# Patient Record
Sex: FEMALE | Race: BLACK OR AFRICAN AMERICAN | NOT HISPANIC OR LATINO | Employment: FULL TIME | URBAN - METROPOLITAN AREA
[De-identification: names, ages, dates, MRNs, and addresses within clinical notes are randomized per-mention and may not be internally consistent; named-entity substitution may affect disease eponyms.]

---

## 2023-09-27 ENCOUNTER — HOSPITAL ENCOUNTER (OUTPATIENT)
Dept: DATA CONVERSION | Facility: HOSPITAL | Age: 48
Discharge: HOME | End: 2023-09-27
Payer: COMMERCIAL

## 2023-09-29 DIAGNOSIS — M79.642 LEFT HAND PAIN: ICD-10-CM

## 2023-09-29 DIAGNOSIS — M25.642 STIFFNESS OF LEFT HAND JOINT: Primary | ICD-10-CM

## 2023-10-04 ENCOUNTER — TREATMENT (OUTPATIENT)
Dept: OCCUPATIONAL THERAPY | Facility: CLINIC | Age: 48
End: 2023-10-04
Payer: COMMERCIAL

## 2023-10-04 DIAGNOSIS — M79.642 LEFT HAND PAIN: ICD-10-CM

## 2023-10-04 DIAGNOSIS — M25.642 STIFFNESS OF LEFT HAND JOINT: ICD-10-CM

## 2023-10-04 PROCEDURE — 97110 THERAPEUTIC EXERCISES: CPT | Mod: GO | Performed by: OCCUPATIONAL THERAPIST

## 2023-10-04 ASSESSMENT — PAIN - FUNCTIONAL ASSESSMENT: PAIN_FUNCTIONAL_ASSESSMENT: 0-10

## 2023-10-04 ASSESSMENT — PAIN SCALES - GENERAL: PAINLEVEL_OUTOF10: 0 - NO PAIN

## 2023-10-04 NOTE — PROGRESS NOTES
Occupational Therapy    Occupational Therapy Treatment    Name: Shana Millan  MRN: [id number]  : 1975  Date: 10/04/23  Time Calculation  Start Time: 1030  Stop Time: 1116  Time Calculation (min): 46 min    Assessment:  OT Assessment Results: Decreased upper extremity range of motion (Patient tolerated PROM well despite reports of increased pain; PROM available greater than AROM at this time; weakness noted with digital extension.)  Plan:       Subjective   General:  OT Last Visit  OT Received On: 10/04/23    Patient into clinic with custom digital extension splint donned; no complaints at this time; issued patient replacement finger compression sleeves to be worn with splint.      Pain Assessment:  Pain Assessment  Pain Assessment: 0-10  Pain Score: 0 - No pain (at rest; increased to 10/10 at times during PROM to SF)    Objective      Modalities:  Modalities Used: Yes  Modality 1: Untimed Fluidotherapy (Fluidotherapy in conjunction with AROM ther ex L hand all planes to optimize joint mobility and comfort x10 minutes.)    Therapy/Activity: Therapeutic Exercise  Therapeutic Exercise Performed: Yes  Therapeutic Exercise Activity 1: PROM for L SF PIP extension with 15 seciond hold at end range. (10 reps)  Therapeutic Exercise Activity 2: Reverse blocking L SF (10 reps)  Therapeutic Exercise Activity 3: Isometric lumbrical gripping with L hand (Emphasis on PIP depression; 10 reps)  Therapeutic Exercise Activity 4: L SF extension with soft theraputty (10 reps)    Therapist provided demonstration with verbal instruction for all above there ex; handouts issued as needed for reinforcement/carryover with HEP.    Therapist added padding to DIP portion of finger extension splint to increase stretch and encourage greater extension of PIP joint with wear.    Therapist utilized aluminum splinting material and instructed patient in therapeutic wrapping with [place] at PIP joint to encourage progressive extension;  supplies issued; to be added to HEP; recommending wear 3x/day x15 minutes per session.     OP EDUCATION: Educated patient on importance of follow through with HEP and increased frequency throughout day (patient reports HEP 2x/day currently; recommend 3-5x/day); patient receptive to education.       Goals:

## 2023-10-10 PROBLEM — G40.409 GRAND MAL SEIZURE DISORDER (MULTI): Status: ACTIVE | Noted: 2023-10-10

## 2023-10-10 PROBLEM — S63.259A FINGER DISLOCATION: Status: ACTIVE | Noted: 2023-03-23

## 2023-10-10 PROBLEM — G40.909 SEIZURE DISORDER (MULTI): Status: ACTIVE | Noted: 2023-10-10

## 2023-10-10 PROBLEM — S93.402A SPRAIN OF LEFT ANKLE: Status: ACTIVE | Noted: 2017-01-10

## 2023-10-10 PROBLEM — M24.542 CONTRACTURE OF JOINT OF FINGER OF LEFT HAND: Status: ACTIVE | Noted: 2023-10-10

## 2023-10-10 PROBLEM — M25.472 LEFT ANKLE SWELLING: Status: ACTIVE | Noted: 2017-01-10

## 2023-10-10 RX ORDER — ERYTHROMYCIN 5 MG/G
OINTMENT OPHTHALMIC
COMMUNITY
Start: 2021-09-23

## 2023-10-10 RX ORDER — ZONISAMIDE 100 MG/1
4 CAPSULE ORAL DAILY
COMMUNITY
End: 2023-10-11 | Stop reason: SDUPTHER

## 2023-10-10 RX ORDER — LEVETIRACETAM 750 MG/1
2 TABLET ORAL 2 TIMES DAILY
COMMUNITY
End: 2023-10-11 | Stop reason: SDUPTHER

## 2023-10-10 RX ORDER — OLOPATADINE HYDROCHLORIDE 1 MG/ML
SOLUTION/ DROPS OPHTHALMIC
COMMUNITY
Start: 2020-02-28

## 2023-10-10 RX ORDER — DICLOFENAC SODIUM 75 MG/1
TABLET, DELAYED RELEASE ORAL
COMMUNITY
Start: 2022-03-16

## 2023-10-10 RX ORDER — CARBOXYMETHYLCELLULOSE SODIUM 2.5 MG/ML
SOLUTION/ DROPS OPHTHALMIC
COMMUNITY

## 2023-10-10 RX ORDER — HYALURONATE SODIUM 20 MG/2 ML
SYRINGE (ML) INTRAARTICULAR
COMMUNITY
Start: 2022-05-10

## 2023-10-10 NOTE — PROGRESS NOTES
Subjective     Shana Millan is a  48 y.o. female who presents with No chief complaint on file.  . .    Visit type: follow up visit    HPI   Last seen by Dr. Russell in 1/2022. At that time, her seizures were well controlled on Keppra 750 mg, 2 tablets twice a day and zonisamide 100 mg, 4 capsules daily and current medication were continued.     Since then, she has been doing well. Had one event likely provoked by illness as below, otherwise seizure free and stable on current meds for about 6 years.     Semiology: GTC  Aura: none   Last event: She was taken to the hospital about 2 weeks ago. She had been sick. She had a seizure in the car and sister brought her to hospital (sister was driving) and low potassium and viral illness was treated with supportive care and she was given fluids and sent home. No meds were changed as it was thought to be provoked. Sickness has been a common trigger for her.   Medication review: Keppra 1500 mg BID, zonisamide 400 mg daily. Reports total compliance.  New illness/hospitalization: as above  Mood: up and down because of work, every now and then and feels it comes out of no where.   Memory: good  Work: yes  Sleep: Only gets 2 to 3 hours. Trouble staying asleep. Can go to sleep OK but wakes up in the middle of the night and cannot go back to sleep. Had sleep   Driving: no  Contraception: no   Bloodwork: has not had levels recently     Patient denies any auras, shaking, jerking, convulsions, loss of time or zoning out, tongue or cheek biting, bowel or bladder incontinence, or unexplained bruising.      Review of Systems  10 point review of systems performed and is negative except as noted in the HPI.     All other systems have been reviewed and are negative for complaint.    Past Medical History:   Diagnosis Date    Epilepsy, unspecified, not intractable, without status epilepticus (CMS/HCC)     Epilepsy    Migraine, unspecified, not intractable, without status migrainosus      Migraines    Personal history of other specified conditions     History of insomnia       No relevant family history has been documented for this patient.    Past Surgical History:   Procedure Laterality Date    OTHER SURGICAL HISTORY  04/22/2020    Meniscus repair    OTHER SURGICAL HISTORY  04/22/2020    Ear surgery    OTHER SURGICAL HISTORY  04/22/2020    Hand surgery       Social History     Substance and Sexual Activity   Drug Use Not on file     Tobacco Use: Not on file     Alcohol Use: Not on file           Objective     Neurological Exam  Mental Status  Awake, alert and oriented to person, place and time. Oriented to person, place, time and situation. Recent and remote memory are intact. Speech is normal. Language is fluent with no aphasia. Attention and concentration are normal.    Cranial Nerves  CN III, IV, VI: Extraocular movements intact bilaterally. Normal lids and orbits bilaterally.  CN VII: Full and symmetric facial movement.  CN VIII: Hearing is normal.    Motor   No abnormal involuntary movements.  Strength not assessed due to virtual nature of visit..           No results found for this or any previous visit.           Assessment/Plan   A video visit between the patient (at the originating site) and the provider (at the distant site) was utilized to provide this telehealth service.     Verbal consent was requested and obtained from the patient on this date for a telehealth visit. The patient was informed about the telehealth clinical encounter including benefits to avoiding travel, limitations to the assessment, and billing for the service. In person care may be recommended if needed. Telehealth sessions are not being recorded and personal health information is protected. All questions were answered and verbal informal consent was obtained from the patient to proceed.    Problem List Items Addressed This Visit       Seizure disorder (CMS/HCC) - Primary    Overview     Previously seen by   Drew. Last in Jan 2022. At that time, Seizures were well controlled on Keppra 750 mg, 2 tablets twice a day and zonisamide 100 mg, 4 capsules daily         Current Assessment & Plan     -Continue current medications , refills sent   - CBC and BUN/Cr from Aug 2023 WNL.  - Has not had levels in a few years, will obtain for baseline.   - Educated patient that he should not drive, operate heavy machinery, work around sharp objects or open flames, work on high surfaces, or swim or bath alone, or any other activity where he would be a risk to himself or others if a seizure were to occur for at least 6 months and until cleared by a neurologist.  - Follow up in 1 year or sooner as needed         Relevant Medications    levETIRAcetam (Keppra) 750 mg tablet    zonisamide (Zonegran) 100 mg capsule    Other Relevant Orders    Referral to Adult Sleep Medicine    Zonisamide level    Levetiracetam    Vitamin D 25-Hydroxy,Total (for eval of Vitamin D levels)

## 2023-10-10 NOTE — ASSESSMENT & PLAN NOTE
-Continue current medications , refills sent   - CBC and BUN/Cr from Aug 2023 WNL.  - Has not had levels in a few years, will obtain for baseline.   - Educated patient that he should not drive, operate heavy machinery, work around sharp objects or open flames, work on high surfaces, or swim or bath alone, or any other activity where he would be a risk to himself or others if a seizure were to occur for at least 6 months and until cleared by a neurologist.  - Follow up in 1 year or sooner as needed

## 2023-10-10 NOTE — PATIENT INSTRUCTIONS
Continue current medications, refills sent   Please go get a level drawn of your seizure medications at any  lab. Please go first thing in the morning before your first dose if this is possible. You can take your pill with you and take your medication as soon as they draw your blood in order to help us get an accurate level. If you are unable to go first thing in the morning, please go as late as possible before taking your evening dose. If your levels are abnormal, we will call you to adjust your medications if needed.    Follow up in 1 year or sooner as needed.     You have had a seizure. It is recommended that you take precaution to avoid injury with possible future seizure episodes. Avoid activities that can lead to injury such as bathing, swimming alone, driving or operating heavy machinery, or working at heights (no ladders) for 6 months. These precautions restart with any future seizure. Avoid possible triggers for seizure activity including alcohol or drug use, stress, not eating, and sleep deprivation.    Guidelines on when to treat a first seizure found that starting seizure medications after the first seizure can lessen the risk for more seizures in the first 2 years. The report also found:  When an adult has an unprovoked seizure, the risk for more seizures without treatment in the next 2 years can range 21 to 45%. This risk is greatest in people who have had a brain injury (such as stroke or trauma) and seizure activity is seen on the EEG. Other factors that may lead to a higher risk for seizures in the first 2 years are other abnormalities on brain imaging tests and seizures at night.  Starting a seizure medication right away may help lessen this early risk, but does not seem to affect the long-term risk of seizures. The risk of side effects from seizure medications may range from 7 to 31%. Medication side effects are usually mild and go away. Early treatment may not affect the person's quality of  life over time.    Please be sure your family and friends know how to manage any future seizures you may have. This includes staying calm, turning you on your side and removing potential close hazards, cushion your head if able, remove any glasses, do NOT put anything in your mouth, observe and monitor duration. It is normal to be tired and disoriented in your post-ictal (after seizure) phase. This can last several minutes up to hours. You may develop a headache during this time.     Call us to notify us if you have any seizure activity.   When to call 911: if you are injured during the seizure (including hitting your head), if the seizure lasts longer than 5-10 minutes, if your face/lips turn blue, if you do not start to wake up after about 30 mins following the seizure, or if it is not your typical seizure activity.     Additional helpful resources are available at:  [employer] - www.epilepsy.com  [facility] - www.[facility].org

## 2023-10-11 ENCOUNTER — TELEMEDICINE (OUTPATIENT)
Dept: NEUROLOGY | Facility: CLINIC | Age: 48
End: 2023-10-11
Payer: COMMERCIAL

## 2023-10-11 DIAGNOSIS — G40.909 SEIZURE DISORDER (MULTI): Primary | ICD-10-CM

## 2023-10-11 PROCEDURE — 99214 OFFICE O/P EST MOD 30 MIN: CPT

## 2023-10-11 RX ORDER — LEVETIRACETAM 750 MG/1
1500 TABLET ORAL 2 TIMES DAILY
Qty: 360 TABLET | Refills: 3 | Status: SHIPPED | OUTPATIENT
Start: 2023-10-11 | End: 2024-01-09

## 2023-10-11 RX ORDER — ZONISAMIDE 100 MG/1
400 CAPSULE ORAL DAILY
Qty: 360 CAPSULE | Refills: 3 | Status: SHIPPED | OUTPATIENT
Start: 2023-10-11 | End: 2024-01-09

## 2023-10-18 ENCOUNTER — TREATMENT (OUTPATIENT)
Dept: OCCUPATIONAL THERAPY | Facility: CLINIC | Age: 48
End: 2023-10-18
Payer: COMMERCIAL

## 2023-10-18 DIAGNOSIS — M79.642 LEFT HAND PAIN: ICD-10-CM

## 2023-10-18 DIAGNOSIS — M25.642 STIFFNESS OF LEFT HAND JOINT: ICD-10-CM

## 2023-10-18 PROCEDURE — 97110 THERAPEUTIC EXERCISES: CPT | Mod: GO,CO

## 2023-10-18 ASSESSMENT — PAIN SCALES - GENERAL: PAINLEVEL_OUTOF10: 0 - NO PAIN

## 2023-10-18 ASSESSMENT — PAIN - FUNCTIONAL ASSESSMENT: PAIN_FUNCTIONAL_ASSESSMENT: 0-10

## 2023-10-18 NOTE — PROGRESS NOTES
"Occupational Therapy    Occupational Therapy Treatment    Name: Shana Millan  MRN: [id number]  : 1975  Date: 10/18/23  Time Calculation  Start Time: 0930  Stop Time: 1012  Time Calculation (min): 42 min    Assessment:   No edema, continues with contracture of L SF PIP in flexion, some stiffness noted in DIP with flexion. Pt wearing custom L SF ext splint all of the time.  Wrapping finger while in aluminum foam ext splint twice daily during exercise x 10 min. Pt to progress to 2-3 times a day up to 15 min each wear.        Subjective  Reviewed OT goals. Pt verbalized understanding.  General:  OT Last Visit  OT Received On: 10/18/23     Pain Assessment:  Pain Assessment  Pain Assessment: 0-10  Pain Score: 0 - No pain    Objective    Modalities:  Modalities Used: Yes  Modality 1: Untimed Fluidotherapy (Pt completed tendon glides in fluido x 10 min to promote muscle movement during session.)  Splinting:  Splinting  Location: L SF  Type: extension splint  Splinting: Adjustment  Splinting Education: Precautions, Wear schedule  Splinting Comments: adjusted splint to promote more L SF ext, replaced strapping and provided pt with replacement finger compression sleeve     Therapy/Activity:  Manual PROM by writer with focus on PIP ext and DIP flexion.  Pt completed tendon glides while in fluido. Pt completed 15 reps of Reverse blocking of PIP in ext and marker rolls to encourage ext.     Sensory: Intact   Outcome Measures:    Measured AROM on this date of  L SF at:  MP 0/90  PIP -40/85  DIP 0/48    Progress towards goals :     Pt will increase CHAO of L SF to 200 for improved functional use of L Hand- GOAL PARTIALLY MET-  CHAO 183  2.   Pt will increase L SF ext to -10 to promote increased functional use of L hand- GOAL PARTIALLY MET- Pt currently at -40  3.   Pt will independently open a jar with self-reported \"No difficulty\" per UEFI-  GOAL PARTIALLY MET- pt now verbalizing \"a little bit\".  4.   Pt will " "independently groom her hair with self reported \"no difficulty\" per UEFI-GOAL MET- Pt verbalizing \"No difficulty\" at this time.  5.   Pt will increase UEFI score to at least 65/80 to indicate overall improved functional independence and ease- GOAL PARTIALLY MET- Pt score 58/80.    Pt pain at end of session 0/10. No fatigue in L hand. Pt progressing towards all goals. Continues with flexion contracture of L SF PIP and stiffness in L SF DIP. Pt highly motivated to return function to normal. Will progress therapy based on feedback from next doctor appointment scheduled for 10/30/23.                   "

## 2023-10-30 ENCOUNTER — APPOINTMENT (OUTPATIENT)
Dept: ORTHOPEDIC SURGERY | Facility: CLINIC | Age: 48
End: 2023-10-30

## 2023-11-06 ENCOUNTER — APPOINTMENT (OUTPATIENT)
Dept: ORTHOPEDIC SURGERY | Facility: CLINIC | Age: 48
End: 2023-11-06

## 2023-11-20 ENCOUNTER — OFFICE VISIT (OUTPATIENT)
Dept: ORTHOPEDIC SURGERY | Facility: CLINIC | Age: 48
End: 2023-11-20
Payer: COMMERCIAL

## 2023-11-20 DIAGNOSIS — M24.542 CONTRACTURE OF FINGER JOINT, LEFT: Primary | ICD-10-CM

## 2023-11-20 PROCEDURE — 1036F TOBACCO NON-USER: CPT | Performed by: ORTHOPAEDIC SURGERY

## 2023-11-20 PROCEDURE — 99213 OFFICE O/P EST LOW 20 MIN: CPT | Performed by: ORTHOPAEDIC SURGERY

## 2023-11-20 ASSESSMENT — ENCOUNTER SYMPTOMS
CHILLS: 0
JOINT SWELLING: 1
WHEEZING: 0
COLOR CHANGE: 0
SHORTNESS OF BREATH: 0
EYE DISCHARGE: 0
TROUBLE SWALLOWING: 0
FEVER: 0

## 2023-11-20 ASSESSMENT — PAIN - FUNCTIONAL ASSESSMENT: PAIN_FUNCTIONAL_ASSESSMENT: 0-10

## 2023-11-20 ASSESSMENT — PAIN SCALES - GENERAL: PAINLEVEL_OUTOF10: 7

## 2023-11-20 NOTE — PROGRESS NOTES
Reason for Appointment  Improved L small finger contracture    History of Present Illness  Patient is a 48 y.o. female here today for follow-up evaluation of \A left small finger flexion contracture after a dislocation 7 months ago.  She has been in formal therapy and has seen a significant improvement.  She still has some mild stiffness but this has improved since previous visit.  She is not in formal therapy now, she does have an extension splint she wears on the finger.  No other changes in her past medical history, allergies, or medications.     Past Medical History:   Diagnosis Date    Epilepsy, unspecified, not intractable, without status epilepticus (CMS/[facility])     Epilepsy    Migraine, unspecified, not intractable, without status migrainosus     Migraines    Personal history of other specified conditions     History of insomnia       Past Surgical History:   Procedure Laterality Date    OTHER SURGICAL HISTORY  04/22/2020    Meniscus repair    OTHER SURGICAL HISTORY  04/22/2020    Ear surgery    OTHER SURGICAL HISTORY  04/22/2020    Hand surgery       Medication Documentation Review Audit       Reviewed by Maria Antonia Maldonado PA-C (Physician Assistant) on 11/20/23 at 0911      Medication Order Taking? Sig Documenting Provider Last Dose Status   carboxymethylcellulose (TheraTears) 0.25 % ophthalmic solution [id number]  Administer into affected eye(s). Historical Provider, MD  Active   diclofenac (Voltaren) 75 mg EC tablet [id number]  Take by mouth. Historical Provider, MD  Active   erythromycin (Romycin) 5 mg/gram (0.5 %) ophthalmic ointment [id number]  Use as directed Historical Provider, MD  Active   levETIRAcetam (Keppra) 750 mg tablet [id number]  Take 2 tablets (1,500 mg) by mouth 2 times a day. Berenice Tobar PA-C  Active   olopatadine (Patanol) 0.1 % ophthalmic solution [id number]  Administer into affected eye(s). Historical Provider, MD  Active   sodium hyaluronate (Euflexxa) 10 mg/mL(mw 2.4 -3.6 million)  injection [id number]  Inject 2 mL weekly x 3 to the right knee Historical Provider, MD  Active   zonisamide (Zonegran) 100 mg capsule [id number]  Take 4 capsules (400 mg) by mouth once daily. At noon Berenice Tobar PA-C  Active                    No Known Allergies    Review of Systems   Constitutional:  Negative for chills and fever.   HENT:  Negative for trouble swallowing.    Eyes:  Negative for discharge.   Respiratory:  Negative for shortness of breath and wheezing.    Cardiovascular:  Negative for chest pain.   Musculoskeletal:  Positive for joint swelling.   Skin:  Negative for color change and pallor.   All other systems reviewed and are negative.    Exam   On exam she still has some swelling over the left small PIP joint but significantly improved contracture, only about a 15 degree flexion contracture at the PIP joint today.  She has some mild stiffness with full flexion of the digit.  Good pulses and sensation in the upper extremity.    Assessment   Encounter Diagnosis   Name Primary?    Contracture of finger joint, left Yes       Plan   She has seen significant improvement in terms of the contracture in the finger.  We would like to avoid surgery at this point, she needs to continue to work in formal therapy on swelling control and regaining motion as well as dynamic splinting.  She can follow-up with us in 2 months    Written by Maria Antonia Maldonado PA-C

## 2023-11-29 ENCOUNTER — APPOINTMENT (OUTPATIENT)
Dept: OCCUPATIONAL THERAPY | Facility: CLINIC | Age: 48
End: 2023-11-29
Payer: COMMERCIAL

## 2023-11-29 NOTE — PROGRESS NOTES
Occupational Therapy                 Therapy Communication Note    Patient Name: Shana Millan  MRN: [id number]  Today's Date: 11/29/2023     Discipline: Occupational Therapy    Missed Time: Pt called to Cancel at 12:30 p.m due to unable to make it to appointment today.

## 2023-12-07 ENCOUNTER — DOCUMENTATION (OUTPATIENT)
Dept: OCCUPATIONAL THERAPY | Facility: CLINIC | Age: 48
End: 2023-12-07

## 2023-12-07 ENCOUNTER — APPOINTMENT (OUTPATIENT)
Dept: OCCUPATIONAL THERAPY | Facility: CLINIC | Age: 48
End: 2023-12-07
Payer: COMMERCIAL

## 2023-12-07 NOTE — PROGRESS NOTES
Occupational Therapy                 Therapy Communication Note    Patient Name: Shana Millan  MRN: [id number]  Today's Date: 12/7/2023     Discipline: Occupational Therapy    Missed Visit Reason:  Patient canceled appointment through centralized scheduling this date.    Missed Time: Cancel    Comment:

## 2023-12-14 ENCOUNTER — TREATMENT (OUTPATIENT)
Dept: OCCUPATIONAL THERAPY | Facility: CLINIC | Age: 48
End: 2023-12-14
Payer: COMMERCIAL

## 2023-12-14 DIAGNOSIS — M79.642 LEFT HAND PAIN: Primary | ICD-10-CM

## 2023-12-14 PROCEDURE — 97110 THERAPEUTIC EXERCISES: CPT | Mod: GO,CO

## 2023-12-14 ASSESSMENT — PAIN - FUNCTIONAL ASSESSMENT: PAIN_FUNCTIONAL_ASSESSMENT: 0-10

## 2023-12-14 ASSESSMENT — PAIN SCALES - GENERAL: PAINLEVEL_OUTOF10: 0 - NO PAIN

## 2023-12-14 NOTE — PROGRESS NOTES
Occupational Therapy    Occupational Therapy Treatment    Name: Shana Millan  MRN: [id number]  : 1975  Date: 23  Time Calculation  Start Time: 0947  Stop Time: 7  Time Calculation (min): 40 min      Visit 2 of 5 approved, time frame 10/18/23-23  Insurance: [employer]   Member ID [id number]  Will need re-authorization prior to next visit due to out of time frame.     Referred by Dr. Maria Antonia Maldonado. Next follow up on 24    Assessment:   Pt arrived with new orders from Maria Antonia Maldonado PA-C to continue to work on dynamic and static splinting and regaining full digital motion. Swelling control with compression.     No edema, continues with contracture of L SF PIP in flexion and extension, some stiffness noted in DIP with flexion.     Splinting:   Pt is wearing custom SF ext splint all day and night along with compression sleeve under. Writer replaced all strapping, velcro and padding on this date. Splint continues to place SF in full ext. Pt also using exercise splint at home to encourage PIP ext 2 times daily at 5 reps with 5 sec hold. Encouraged to increase reps to 10.        Subjective  Reviewed OT goals. Pt verbalized understanding. Pt stated she works 10 hour days, 6 days a week and that her job does not require heavy lifting. 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 Pt frustrated with fine motor tasks.      Pain Assessment:  Pain Assessment  Pain Assessment: 0-10  Pain Score: 0 - No pain    Objective    Modalities:  AROM/tendon glides while in fluido therapy x 10 min to promote muscle movement during session      Therapy/Activity:    Manual PROM by writer with focus on PIP ext and DIP flexion. Good tolerance by pt.   Pt completed 15 reps of Reverse blocking of PIP in ext  with 5 sec hold  Completed marker rolls to encourage ext.  , 2 sets of 10 reps with 5 sec hold.   Pt using orange(low/medium ) resistance theraputty at home  HEP reviewed for isometric foam exer for lumbrica, gross grasp and  "composite pinch  Added opposition exercises with rubber band around marker due to pt reporting difficulty on UEFI pulling her hair up into a pony tail. Added to HEP    Sensory: Intact  No numbness or tingling noted.      Outcome Measures:      UEFI completed today at 77/80(was 58/80) Noted difficulty with fine motor tasks, tying shoes, grooming hair and opening a jar.     Measured AROM on this date of  L SF at:  MP 0(same)/80(was 90)  PIP -25(was -40)/78(was 75)  DIP 0(same)/45(was 48)    Progress towards goals :     Pt will increase CHAO of L SF to 200 for improved functional use of L Hand- GOAL PARTIALLY MET-  CHAO 178 today(was 183 last visit)  2.   Pt will increase L SF ext to -10 to promote increased functional use of L hand- GOAL PARTIALLY MET- Pt currently at -25  3.   Pt will independently open a jar with self-reported \"No difficulty\" per UEFI-  GOAL PARTIALLY MET- pt now verbalizing \"a little bit\".  4.   Pt will independently groom her hair with self reported \"no difficulty\" per UEFI-GOAL Partially MET- Pt verbalizing \"little bit difficulty\" at this time.  5.   Pt will increase UEFI score to at least 65/80 to indicate overall improved functional independence and ease- GOAL MET.    Pt pain at end of session 0/10. No fatigue in L hand. Pt Continues with flexion contracture of L SF PIP and stiffness in L SF DIP. Pt highly motivated to return function to normal. Will ask for date extension in order for pt to meet established goals.   Education  Individual(s) Educated: Patient  Education Provided: Edema control, Orthotics wearing schedule and precautions, POC discussed and agreed upon  Home Program: PROM, AROM, Edema control, Fine motor tasks, Modalities, Orthotic wearing schedule, care and precautions, Strengthening  Equipment: Thera-putty, Sleeve, Biofreeze, Foam sponge gripper  Patient/Caregiver Demonstrated Understanding: yes  Plan of Care Discussed and Agreed Upon: yes  Patient Response to Education: " Patient/Caregiver Verbalized Understanding of Information, Patient/Caregiver Performed Return Demonstration of Exercises/Activities, Patient/Caregiver Asked Appropriate Questions

## 2024-01-29 ENCOUNTER — APPOINTMENT (OUTPATIENT)
Dept: ORTHOPEDIC SURGERY | Facility: CLINIC | Age: 49
End: 2024-01-29

## 2024-02-23 ENCOUNTER — DOCUMENTATION (OUTPATIENT)
Dept: OCCUPATIONAL THERAPY | Facility: CLINIC | Age: 49
End: 2024-02-23

## 2024-02-23 NOTE — PROGRESS NOTES
"Occupational Therapy Discharge [facility]    Patient Name: Shana Millan  MRN: [id number]  Today's Date: 2/23/2024    Subjective   Current Problem:  Contracture of L SF.    Pain: N/a       Objective   Interventions: Ther ex, ther act, splinting, manual, neuromuscular re-education     ADL Assessment: UEFI= 58/80 as of 10/158/24     Extremity Assessments:      AROM L SF:  MP 0/90  PIP -40/85  DIP 0/48    Assessment/Plan Patient inconsistently seen throughout POC due to multiple cancellations and no shows for scheduled appointments. Due to lapsed authorization period, patient obtained several re-authorizations and patient failed to attend approved appointments.          Goals:   Pt will increase CHAO of L SF to 200 for improved functional use of L Hand- GOAL PARTIALLY MET-    EUO=118 degrees  2.   Pt will increase L SF ext to -10 to promote increased functional use of L hand- GOAL PARTIALLY MET-  PIP extension= -40  3.   Pt will independently open a jar with self-reported \"No difficulty\" per UEFI-  GOAL PARTIALLY ME  \"A little bit\" of difficulty reported  4.   Pt will independently groom her hair with self reported \"no difficulty\" per UEFI-GOAL MET-   \"No difficulty\" reported  5.   Pt will increase UEFI score to at least 65/80 to indicate overall improved functional independence/ease-  58/80.  "

## 2024-03-18 ENCOUNTER — OFFICE VISIT (OUTPATIENT)
Dept: ORTHOPEDIC SURGERY | Facility: CLINIC | Age: 49
End: 2024-03-18
Payer: COMMERCIAL

## 2024-03-18 ENCOUNTER — HOSPITAL ENCOUNTER (OUTPATIENT)
Dept: RADIOLOGY | Facility: CLINIC | Age: 49
Discharge: HOME | End: 2024-03-18
Payer: COMMERCIAL

## 2024-03-18 DIAGNOSIS — M24.542 CONTRACTURE OF FINGER JOINT, LEFT: Primary | ICD-10-CM

## 2024-03-18 DIAGNOSIS — M79.645 FINGER PAIN, LEFT: ICD-10-CM

## 2024-03-18 PROCEDURE — 99213 OFFICE O/P EST LOW 20 MIN: CPT | Performed by: ORTHOPAEDIC SURGERY

## 2024-03-18 PROCEDURE — 1036F TOBACCO NON-USER: CPT | Performed by: ORTHOPAEDIC SURGERY

## 2024-03-18 PROCEDURE — 20604 DRAIN/INJ JOINT/BURSA W/US: CPT | Performed by: ORTHOPAEDIC SURGERY

## 2024-03-18 PROCEDURE — 73140 X-RAY EXAM OF FINGER(S): CPT | Mod: LT

## 2024-03-18 RX ORDER — METHYLPREDNISOLONE ACETATE 40 MG/ML
10 INJECTION, SUSPENSION INTRA-ARTICULAR; INTRALESIONAL; INTRAMUSCULAR; SOFT TISSUE
Status: COMPLETED | OUTPATIENT
Start: 2024-03-18 | End: 2024-03-18

## 2024-03-18 RX ORDER — LIDOCAINE HYDROCHLORIDE 10 MG/ML
0.5 INJECTION INFILTRATION; PERINEURAL
Status: COMPLETED | OUTPATIENT
Start: 2024-03-18 | End: 2024-03-18

## 2024-03-18 RX ADMIN — LIDOCAINE HYDROCHLORIDE 0.5 ML: 10 INJECTION INFILTRATION; PERINEURAL at 12:39

## 2024-03-18 RX ADMIN — METHYLPREDNISOLONE ACETATE 10 MG: 40 INJECTION, SUSPENSION INTRA-ARTICULAR; INTRALESIONAL; INTRAMUSCULAR; SOFT TISSUE at 12:39

## 2024-03-18 ASSESSMENT — ENCOUNTER SYMPTOMS
WHEEZING: 0
COLOR CHANGE: 0
CHILLS: 0
TROUBLE SWALLOWING: 0
FEVER: 0
JOINT SWELLING: 1
SHORTNESS OF BREATH: 0
EYE DISCHARGE: 0

## 2024-03-18 ASSESSMENT — PAIN - FUNCTIONAL ASSESSMENT: PAIN_FUNCTIONAL_ASSESSMENT: 0-10

## 2024-03-18 ASSESSMENT — PAIN SCALES - GENERAL: PAINLEVEL_OUTOF10: 5 - MODERATE PAIN

## 2024-03-18 NOTE — PROGRESS NOTES
Reason for Appointment  L small finger pain    History of Present Illness  Patient is a 49 y.o. female here today for follow-up evaluation of continued left small finger pain.  She is 10 months status post a left small finger PIP joint dislocation.  She developed a flexion contracture at the PIP joint, she worked in formal therapy and has a splint and she has seen some improvement in terms of the contracture.  She continues to have mostly pain over the PIP joint and swelling.  She did have another incident where she jammed the finger a week ago, x-rays taken today are reviewed and do not show any acute fracture but she has had some increased pain since that time. No other changes in her past medical history, allergies, or medications.     Past Medical History:   Diagnosis Date    Epilepsy, unspecified, not intractable, without status epilepticus (CMS/[facility])     Epilepsy    Migraine, unspecified, not intractable, without status migrainosus     Migraines    Personal history of other specified conditions     History of insomnia       Past Surgical History:   Procedure Laterality Date    OTHER SURGICAL HISTORY  04/22/2020    Meniscus repair    OTHER SURGICAL HISTORY  04/22/2020    Ear surgery    OTHER SURGICAL HISTORY  04/22/2020    Hand surgery       Medication Documentation Review Audit       Reviewed by Marcela Rodriguez MA (Medical Assistant) on 03/18/24 at 1115      Medication Order Taking? Sig Documenting Provider Last Dose Status   carboxymethylcellulose (TheraTears) 0.25 % ophthalmic solution [id number] Yes Administer into affected eye(s). Historical Provider, MD Taking Active   diclofenac (Voltaren) 75 mg EC tablet [id number] Yes Take by mouth. Historical Provider, MD Taking Active   erythromycin (Romycin) 5 mg/gram (0.5 %) ophthalmic ointment [id number] Yes Use as directed Historical Provider, MD Taking Active   levETIRAcetam (Keppra) 750 mg tablet [id number]  Take 2 tablets (1,500 mg) by mouth 2 times a day.  Berenice Tobar PA-C   24 2359   olopatadine (Patanol) 0.1 % ophthalmic solution [id number] Yes Administer into affected eye(s). Historical Provider, MD Taking Active   sodium hyaluronate (Euflexxa) 10 mg/mL(mw 2.4 -3.6 million) injection [id number] Yes Inject 2 mL weekly x 3 to the right knee Historical Provider, MD Taking Active   zonisamide (Zonegran) 100 mg capsule [id number]  Take 4 capsules (400 mg) by mouth once daily. At noon Berenice Tobar PA-C   24 2356                    No Known Allergies    Review of Systems   Constitutional:  Negative for chills and fever.   HENT:  Negative for mouth sores and trouble swallowing.    Eyes:  Negative for discharge.   Respiratory:  Negative for shortness of breath and wheezing.    Cardiovascular:  Negative for chest pain.   Musculoskeletal:  Positive for joint swelling.   Skin:  Negative for color change and pallor.   All other systems reviewed and are negative.    Exam   On exam the left small finger shows again about a 15 degree flexion contracture at the PIP joint, she still has some swelling over the PIP joint and tenderness to palpation.  She has some mild stiffness with flexion but retained motion overall.  Good pulses and sensation in the upper extremity.    Assessment   Encounter Diagnosis   Name Primary?    Finger pain, left Yes   Left small finger PIP joint contracture    Plan   She has continued to work on splinting and in therapy and has not seen much improvement.  Her biggest issue is pain and swelling in the joint.  We discussed a simple steroid injection today to see how much relief this gives her.  We sterilely injected under ultrasound guidance Depo-Medrol lidocaine in the left small finger PIP joint.  Patient understands the small risk of infection and the signs look for as well as flare reaction.  Hopefully this gives her good relief.  She can follow-up with us in 8 weeks.    S Inj/Asp: L small PIP on 3/18/2024 12:39  PM  Indications: pain  Details: 25 G needle, ultrasound-guided  Medications: 0.5 mL lidocaine 10 mg/mL (1 %); 10 mg methylPREDNISolone acetate 40 mg/mL  Outcome: tolerated well, no immediate complications    After discussing the risks and benefits of the procedure, we proceeded with the injection.  Under ultrasound guidance the left small finger metacarpal head and the base of the proximal phalanx was identified.  We then placed the needle under ultrasound guidance into the joint space and sterilely injected 20 mg of Depo-Medrol and a small amount of lidocaine.  Patient tolerated the procedure well without any adverse side effects.   Procedure, treatment alternatives, risks and benefits explained, specific risks discussed. Immediately prior to procedure a time out was called to verify the correct patient, procedure, equipment, support staff and site/side marked as required. Patient was prepped and draped in the usual sterile fashion.       Written by Maria Antonia Guillory saw evaluated, and treated the patient with the PA

## 2024-05-13 ENCOUNTER — APPOINTMENT (OUTPATIENT)
Dept: ORTHOPEDIC SURGERY | Facility: CLINIC | Age: 49
End: 2024-05-13
Payer: COMMERCIAL

## 2024-06-03 ENCOUNTER — OFFICE VISIT (OUTPATIENT)
Dept: ORTHOPEDIC SURGERY | Facility: CLINIC | Age: 49
End: 2024-06-03
Payer: COMMERCIAL

## 2024-06-03 DIAGNOSIS — M24.542 CONTRACTURE OF FINGER JOINT, LEFT: Primary | ICD-10-CM

## 2024-06-03 PROCEDURE — 99213 OFFICE O/P EST LOW 20 MIN: CPT | Performed by: ORTHOPAEDIC SURGERY

## 2024-06-03 PROCEDURE — 1036F TOBACCO NON-USER: CPT | Performed by: ORTHOPAEDIC SURGERY

## 2024-06-03 ASSESSMENT — ENCOUNTER SYMPTOMS
WOUND: 0
SHORTNESS OF BREATH: 0
CHILLS: 0
EYE DISCHARGE: 0
ARTHRALGIAS: 1
TROUBLE SWALLOWING: 0
JOINT SWELLING: 1
FEVER: 0
WHEEZING: 0

## 2024-06-03 ASSESSMENT — PAIN - FUNCTIONAL ASSESSMENT: PAIN_FUNCTIONAL_ASSESSMENT: 0-10

## 2024-06-03 ASSESSMENT — PAIN SCALES - GENERAL: PAINLEVEL_OUTOF10: 0 - NO PAIN

## 2024-06-03 NOTE — PROGRESS NOTES
Reason for Appointment  Chief Complaint   Patient presents with    Left Little Finger - Follow-up     Pain/cramping     History of Present Illness  Patient is a 49 y.o. female here today for follow-up evaluation of continued left small finger pain. She is over a year status post left small finger PIP joint dislocation.  She continues to have a flexion contracture.  The finger will swell on her at times and she states she gets some cramping in the finger with a lot of repetitive activity.  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  She had a PIP joint injection back in March that also did not help her.  No other changes in her past medical history, allergies, or medications.     Past Medical History:   Diagnosis Date    Epilepsy, unspecified, not intractable, without status epilepticus ([facility])     Epilepsy    Migraine, unspecified, not intractable, without status migrainosus     Migraines    Personal history of other specified conditions     History of insomnia       Past Surgical History:   Procedure Laterality Date    OTHER SURGICAL HISTORY  2020    Meniscus repair    OTHER SURGICAL HISTORY  2020    Ear surgery    OTHER SURGICAL HISTORY  2020    Hand surgery       Medication Documentation Review Audit       Reviewed by Maria Antonia Maldonado PA-C (Physician Assistant) on 24 at 0812      Medication Order Taking? Sig Documenting Provider Last Dose Status   carboxymethylcellulose (TheraTears) 0.25 % ophthalmic solution [id number] Yes Administer into affected eye(s). Historical Provider, MD Taking Active   diclofenac (Voltaren) 75 mg EC tablet [id number] Yes Take by mouth. Historical Provider, MD Taking Active   erythromycin (Romycin) 5 mg/gram (0.5 %) ophthalmic ointment [id number] Yes Use as directed Historical Provider, MD Taking Active   levETIRAcetam (Keppra) 750 mg tablet [id number]  Take 2 tablets (1,500 mg) by mouth 2 times a day. Berenice Tobar PA-C   24 3152   olopatadine (Patanol)  0.1 % ophthalmic solution [id number] Yes Administer into affected eye(s). Historical Provider, MD Taking Active   sodium hyaluronate (Euflexxa) 10 mg/mL(mw 2.4 -3.6 million) injection [id number] Yes Inject 2 mL weekly x 3 to the right knee Historical Provider, MD Taking Active   zonisamide (Zonegran) 100 mg capsule [id number]  Take 4 capsules (400 mg) by mouth once daily. At noon Berenice Tobar PA-C   24 2359                    No Known Allergies    Review of Systems   Constitutional:  Negative for chills and fever.   HENT:  Negative for ear pain, postnasal drip and trouble swallowing.    Eyes:  Negative for discharge.   Respiratory:  Negative for shortness of breath and wheezing.    Cardiovascular:  Negative for chest pain.   Musculoskeletal:  Positive for arthralgias and joint swelling.   Skin:  Negative for rash and wound.   All other systems reviewed and are negative.    Exam   On exam the left small finger shows about a 30 degree flexion contracture at the PIP joint, she does have some mild skin thinning from previous injection.  No active triggering or A1 pulley tenderness today.  She does have good flexion of the digit.  Mild tenderness over the PIP joint.  Good pulses and sensation in the upper extremity.    Assessment   Left small finger flexion contracture    Plan   We had a long discussion with her today, we do long discussion today with her about her options.  Any operative treatment in terms of capsular release may make her worse and she may develop significant stiffness with flexion and she has good functional motion today.  A previous injection did not give her much improvement in pain or stiffness.  We will try a Dynasplint with intense home exercises see if we can see any improvement in the contracture.  She can follow-up with us in 3 months.    Written by Maria Antonia Guillory saw, evaluated, and treated the patient with the PA

## 2024-06-11 ENCOUNTER — EVALUATION (OUTPATIENT)
Dept: OCCUPATIONAL THERAPY | Facility: HOSPITAL | Age: 49
End: 2024-06-11
Payer: COMMERCIAL

## 2024-06-11 DIAGNOSIS — M79.645 FINGER PAIN, LEFT: Primary | ICD-10-CM

## 2024-06-11 DIAGNOSIS — M24.542 CONTRACTURE OF FINGER JOINT, LEFT: ICD-10-CM

## 2024-06-11 PROCEDURE — 97165 OT EVAL LOW COMPLEX 30 MIN: CPT | Mod: GO | Performed by: OCCUPATIONAL THERAPIST

## 2024-06-11 ASSESSMENT — LIFESTYLE VARIABLES
SKIP TO QUESTIONS 9-10: 1
HOW OFTEN DO YOU HAVE A DRINK CONTAINING ALCOHOL: NEVER
HOW MANY STANDARD DRINKS CONTAINING ALCOHOL DO YOU HAVE ON A TYPICAL DAY: PATIENT DOES NOT DRINK
HOW OFTEN DO YOU HAVE SIX OR MORE DRINKS ON ONE OCCASION: NEVER
AUDIT-C TOTAL SCORE: 0

## 2024-06-11 ASSESSMENT — COLUMBIA-SUICIDE SEVERITY RATING SCALE - C-SSRS
1. IN THE PAST MONTH, HAVE YOU WISHED YOU WERE DEAD OR WISHED YOU COULD GO TO SLEEP AND NOT WAKE UP?: NO
2. HAVE YOU ACTUALLY HAD ANY THOUGHTS OF KILLING YOURSELF?: NO
6. HAVE YOU EVER DONE ANYTHING, STARTED TO DO ANYTHING, OR PREPARED TO DO ANYTHING TO END YOUR LIFE?: NO

## 2024-06-11 ASSESSMENT — ENCOUNTER SYMPTOMS
DEPRESSION: 0
LOSS OF SENSATION IN FEET: 0
OCCASIONAL FEELINGS OF UNSTEADINESS: 0

## 2024-06-11 ASSESSMENT — PAIN SCALES - GENERAL: PAINLEVEL_OUTOF10: 0 - NO PAIN

## 2024-06-11 ASSESSMENT — ACTIVITIES OF DAILY LIVING (ADL)
BATHING_ASSISTANCE: INDEPENDENT
ADL_ASSISTANCE: INDEPENDENT

## 2024-06-11 ASSESSMENT — PAIN - FUNCTIONAL ASSESSMENT: PAIN_FUNCTIONAL_ASSESSMENT: 0-10

## 2024-06-11 NOTE — PROGRESS NOTES
Occupational Therapy  Occupational Therapy Evaluation    Patient Name: Shana Millan  MRN: [id number]  : 1975  Today's Date: 2024  Time Calculation  Start Time: 1007  Stop Time: 1045  Time Calculation (min): 38 min  Today's Charges:  OT Evaluation Time Entry  OT Evaluation (Low) Time Entry: 38    Current Problem  Problem List Items Addressed This Visit             ICD-10-CM    Contracture of finger joint, left M24.542    Relevant Orders    Follow Up In Occupational Therapy    Finger pain, left - Primary M79.645    Relevant Orders    Follow Up In Occupational Therapy     Insurance  Payor: [facility] / Plan: [facility] / Product Type: *No Product type* /     Assessment  OT Assessment  OT Assessment Results: Decreased ADL status, Decreased upper extremity range of motion, Decreased upper extremity strength, Decreased fine motor control  Strengths: Ability to acquire knowledge, Attitude of self, Rehab experience, Support of extended family/friends  Clinical Presentation: Stable and/or uncomplicated characteristics  Pt. Tolerated session satisfactorily.  Patient is a 48 yo female  s/p R small finger injury resulting in PIP contracture limiting participation in pain-free ADLs and inability to perform at their prior level of function. Pt. Reports decreased ability to engage in work tasks, pain with flexion and difficulty straightening small finger. Pt. Displays decreased ROM, strength and pain. Pt displays redness at PIP joint with increased discomfort. Pt would benefit from occupational therapy to address the above impairments in order to return to safe and pain-free ADLs and prior level of function.    Plan  Outpatient Plan  OT Plan: ; tendon glides, extension  Frequency: 1x/wk  Duration: 6 weeks  Onset Date: 23  Certification Period Start Date: 24  Certification Period End Date: 24  Number of Treatments Authorized: 6 (red chart)  Rehab Potential:  "Good  Plan of Care Agreement: Patient  Planned Interventions: Therapeutic Exercise (24507), Therapeutic Activity (55416), Manual Therapy (35739), Ultrasound (17236), Orthosis Fabrication/Fitting (25567, 18374), Hot Packs, and Cold Packs    General  OT Last Visit  OT Received On: 06/11/24  Reason for Referral: PIP contracture  Referred By: Dr. Guillory/Maria Antonia Maldonado PA-C  Medical History Form: Reviewed and scanned into chart   Previous Interventions/Treatments: Injections  Primary Language: English  Preferred Learning Style: kinesthetic, auditory, verbal, visual, written    Red Flags:   Do you have any of the following? No - patient reports she hs a hx of epilepsy  Osteoporosis    Balance Difficulties/Falls  H/o CA Fever/chills   AM Stiffness Pacemaker Unexplained Weight Changes  Dizziness/Fainting     Unexplained Change in Bowel or Bladder Functions   Unexplained Malaise or Muscle Weakness  Night Pain/Sweats       Social Determinants of Health issues: No  Money  Unemployed/ work conditions  Education/Literacy Childhood experiences   Physical home environment Social supports/coping skills Healthy behaviors Access to healthcare     Subjective  General Comment: movement get a sharp pain  Current condition since injury: Worse    Pt.'s goals for therapy: \"I want to straighten it a little more.\"    Precautions  Precautions  STEADI Fall Risk Score (The score of 4 or more indicates an increased risk of falling): 0    Pain  Pain Assessment  Pain Assessment: 0-10  Pain Score: 0 - No pain  As a result of this session, pt. reported pain remained the same.  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 No    Current ADL/IADL Function  ADL Function  ADL  Eating Assistance: Independent  Grooming Assistance: Independent  Bathing Assistance: Independent  UE Dressing Assistance: Independent  LE Dressing Assistance: Independent  Toileting Assistance with Device: Independent  IADL Function  No report of difficulty.   Reports difficulty folding a sandwich.     Coordination  Coordination  Movements are Fluid and Coordinated: Yes    Hand Function  Hand Function  Gross Grasp: Functional  Coordination: Functional    ROM/Strength  RUE   RUE : Within Functional Limits  Right Hand AROM  R Little  MCP 0-90 (degrees): 92 Degrees  R Little PIP 0-100 (degrees): 88 Degrees  R Little DIP 0-70 (degrees): 90 Degrees  Right Hand Strength -  (lbs)  Handle Setting 2 (lbs): 67.67 lbs (73, 64, 66)  Right Hand Strength - Pinch (lbs)  Lateral (lbs): 14.33 lbs (13, 15, 15)  Tip 2 Point (lbs): 10.67 lbs (10, 11, 11)  Three Jaw Matt (lbs): 13.33 lbs (14, 12, 14)  LUE   LUE: Within Functional Limits  Left Hand AROM  L Little  MCP 0-90 (degrees): 78 Degrees  L Little PIP 0-100 (degrees): 94 Degrees (34 flex at rest)  L Little DIP 0-70 (degrees): 60 Degrees  Left Hand Strength -  (lbs)  Handle Setting 2 (lbs): 56.33 lbs (60, 54, 55)  Left Hand Strength - Pinch (lbs)  Lateral (lbs): 13.67 lbs (13, 14, 14)  Tip 2 Point  (lbs): 11 lbs (10, 12, 11)  Three Jaw Matt (lbs): 11.33 lbs (10, 13, 11)    Treatment  This therapist instructed and demonstrated interventions to patient, patient completed the following under direct supervision of this therapist:  Therapeutic Exercise  Therapeutic Exercise Performed: Yes ROM and strengthening exercises completed this date. Pt. engaged in tendon glides x 10 reps. Handout provided. Education on passive extension stretch. Patient indicated understanding.     Education  Education  Individual(s) Educated: Patient  Education Provided: Diagnosis & Precautions, Symptom management, Orthotics wearing schedule and precautions, Orthotics and/or prosthetic trainging, Risk and benefits of OT discussed with patient or other, POC discussed and agreed upon  Home Program: Tendon gliding, Handout issued  Equipment:  (edu on type of splint needed for finger)  Risk and Benefits Discussed with Patient/Caregiver/Other: yes  Patient/Caregiver Demonstrated Understanding: yes  Plan of Care Discussed and Agreed Upon: yes  Patient Response to Education: Patient/Caregiver Verbalized Understanding of Information, Patient/Caregiver Performed Return Demonstration of Exercises/Activities, Patient/Caregiver Asked Appropriate Questions    HEP Provided Today: Yes - tendon glides     Outcome Measures  OT Adult Other Outcome Measures  9 Hole Peg Test: LUE: 27s; RUE: 23.1s  Other Outcome Measures: quick DASH: 26 raw = 40 (out of 10)      Goals  Active       OT Goals       Pt. will decrease QuickDASH score by 8 points to show improved ability to participate in daily tasks with minimal difficulty/pain.        Start:  06/11/24    Expected End:  07/26/24       quick DASH: 26 raw = 40 (out of 10)         Pt. will demonstrate MI with stating and demonstrating home exercise program in order to improve patient's ability to perform self-care, household, and/or leisure tasks.        Start:  06/11/24    Expected End:  07/05/24            Pt.  will improve LUE small finger ROM in order to perform self-care, household, work and/or leisure tasks. PIP flex at rest 25deg extensor lag          Start:  06/11/24    Expected End:  07/05/24            Pt. will increase LUE hand strength to increase participation in self-care, household, and leisure tasks. : 60lbs        Start:  06/11/24    Expected End:  07/26/24            Pt. will don/doff orthosis with MI and verbalize orthotic wear and care to ensure proper support of orthosis to protect injury when completing daily tasks.        Start:  06/11/24    Expected End:  07/26/24                DHAVAL Abdulalhi/L

## 2024-06-18 ENCOUNTER — DOCUMENTATION (OUTPATIENT)
Dept: OCCUPATIONAL THERAPY | Facility: HOSPITAL | Age: 49
End: 2024-06-18
Payer: COMMERCIAL

## 2024-06-18 NOTE — PROGRESS NOTES
Occupational Therapy   Therapy Communication Note    Patient Name: Shana Millan  MRN: [id number]  Today's Date: 6/18/2024     Discipline: Occupational Therapy    Missed Visit Reason:      Missed Time: No Show    Comment: OT phoned patient regarding missed appt. Patient stated she is currently in ER d/t fall down her stairs on her way to therapy. Patient rescheduled appt for Friday.

## 2024-06-21 ENCOUNTER — APPOINTMENT (OUTPATIENT)
Dept: OCCUPATIONAL THERAPY | Facility: HOSPITAL | Age: 49
End: 2024-06-21
Payer: COMMERCIAL

## 2024-06-21 ENCOUNTER — DOCUMENTATION (OUTPATIENT)
Dept: OCCUPATIONAL THERAPY | Facility: HOSPITAL | Age: 49
End: 2024-06-21
Payer: COMMERCIAL

## 2024-06-21 NOTE — PROGRESS NOTES
Occupational Therapy  Therapy Communication Note    Patient Name: Shana Millan  MRN: [id number]  Today's Date: 6/21/2024     Discipline: Occupational Therapy    Missed Time: Cancel    Comment: Patient canceled via MyChart without reason.

## 2024-06-28 ENCOUNTER — DOCUMENTATION (OUTPATIENT)
Dept: OCCUPATIONAL THERAPY | Facility: HOSPITAL | Age: 49
End: 2024-06-28
Payer: COMMERCIAL

## 2024-06-28 ENCOUNTER — APPOINTMENT (OUTPATIENT)
Dept: OCCUPATIONAL THERAPY | Facility: HOSPITAL | Age: 49
End: 2024-06-28
Payer: COMMERCIAL

## 2024-06-28 NOTE — PROGRESS NOTES
Occupational Therapy  Therapy Communication Note    Patient Name: Shana Millan  MRN: [id number]  Today's Date: 6/28/2024     Discipline: Occupational Therapy    Missed Time: Cancel    Comment: Patient canceled appt today. Patient rescheduled for another day.

## 2024-07-02 ENCOUNTER — TREATMENT (OUTPATIENT)
Dept: OCCUPATIONAL THERAPY | Facility: HOSPITAL | Age: 49
End: 2024-07-02
Payer: COMMERCIAL

## 2024-07-02 DIAGNOSIS — M79.645 FINGER PAIN, LEFT: Primary | ICD-10-CM

## 2024-07-02 DIAGNOSIS — M24.542 CONTRACTURE OF FINGER JOINT, LEFT: ICD-10-CM

## 2024-07-02 PROCEDURE — 97110 THERAPEUTIC EXERCISES: CPT | Mod: GO | Performed by: OCCUPATIONAL THERAPIST

## 2024-07-02 PROCEDURE — 97035 APP MDLTY 1+ULTRASOUND EA 15: CPT | Mod: GO | Performed by: OCCUPATIONAL THERAPIST

## 2024-07-02 ASSESSMENT — PAIN - FUNCTIONAL ASSESSMENT: PAIN_FUNCTIONAL_ASSESSMENT: 0-10

## 2024-07-02 ASSESSMENT — PAIN SCALES - GENERAL: PAINLEVEL_OUTOF10: 0 - NO PAIN

## 2024-07-02 NOTE — PROGRESS NOTES
Occupational Therapy  Occupational Therapy Treatment    Patient Name: Shana Millan  MRN: [id number]  : 1975  Today's Date: 2024  Time Calculation  Start Time: 833  Stop Time: 915  Time Calculation (min): 42 min  Today's Charges:  OT Therapeutic Procedures Time Entry  Therapeutic Exercise Time Entry: 34  OT Modalities Time Entry  Ultrasound Time Entry: 8    Current Problem  Problem List Items Addressed This Visit             ICD-10-CM    Contracture of finger joint, left M24.542    Finger pain, left - Primary M79.645     Assessment  OT Assessment  OT Assessment Results: Decreased ADL status, Decreased upper extremity range of motion, Decreased upper extremity strength, Decreased fine motor control  Strengths: Ability to acquire knowledge, Attitude of self, Rehab experience, Support of extended family/friends  Clinical Presentation: Stable and/or uncomplicated characteristics  Progress towards goals: Improved ability to complete household activities. Improved tolerance of work related tasks. Improved performance in ADL tasks. Improved ROM. Improved strength. Improved grasp.   Patient tolerated treatment satisfactorily.  Patient was limited in therapy sessions thus far d/t ankle injury and being in a boot. Patient has now returned to OT and is wearing brace as instructed. Edu on new set of exercises today.    Insurance  Payor: [facility] / Plan: [facility] / Product Type: *No Product type* /     Plan  Outpatient Plan  OT Plan: ; review blocking exercises.  Frequency: 1x/wk  Duration: 6 weeks  Onset Date: 23  Certification Period Start Date: 24  Certification Period End Date: 24  Number of Treatments Authorized: 6 (red chart)  Rehab Potential: Good  Plan of Care Agreement: Patient  Planned Interventions: Therapeutic Exercise (82167), Therapeutic Activity (83051), Manual Therapy (54501), Ultrasound (72324), Orthosis Fabrication/Fitting (02376, 88225),  "Hot Packs, and Cold Packs     General  OT Last Visit  OT Received On: 07/02/24  Reason for Referral: PIP contracture  Referred By: Dr. Guillory/Maria Antonia Maldonado PA-C  Primary Language: English  Preferred Learning Style: kinesthetic, auditory, verbal, visual, written    Subjective  General Comment: \"I think the brace is getting it down a little, I keep it on an hour then take it off about 4x a day.\"  Current condition since injury: Better   Performing HEP? Yes    Precautions  Precautions  Medical Precautions: No known precautions/limitation    Pain  Pain Assessment  Pain Assessment: 0-10  0-10 (Numeric) Pain Score: 0 - No pain  As a result of session pt. Reported pain remained the same  End of session pain: 0/10  Aggravating Factors:  n/a  Relieving Factors:  n/a    Treatment  This therapist instructed and demonstrated interventions to patient, patient completed the following under direct supervision of this therapist:  Therapeutic Exercise  Therapeutic Exercise Performed: Yes ROM and strengthening exercises completed this date. Pt. engaged in tendon glides x 15 reps. PIP extension stretch completed x5 reps with 30s holds. Blocking exercises completed to finger this date. Pt. completed DIP flexion blocked, PIP flexion blocked, PIP flexion active isolated, PIP/DIP composite flexion, MP/PIP/DIP composite flexion x 10 reps with 10s holds. Handout provided to patient.   Modalities  Modalities Used: Yes Completed US x 8 minutes x.8cm/2 x 3.3 MHx over L small finger to reduce pain and discomfort to promote ease with session.     Education  Education  Individual(s) Educated: Patient  Education Provided: Diagnosis & Precautions, Symptom management, Orthotics wearing schedule and precautions, Orthotics and/or prosthetic trainging, Risk and benefits of OT discussed with patient or other, POC discussed and agreed upon  Home Program: Handout issued  Risk and Benefits Discussed with Patient/Caregiver/Other: yes  Patient/Caregiver " Demonstrated Understanding: yes  Plan of Care Discussed and Agreed Upon: yes  Patient Response to Education: Patient/Caregiver Verbalized Understanding of Information, Patient/Caregiver Performed Return Demonstration of Exercises/Activities, Patient/Caregiver Asked Appropriate Questions    HEP Provided Today: Yes - blocking exercises OT drawer.     Goals  Active       OT Goals       Pt. will decrease QuickDASH score by 8 points to show improved ability to participate in daily tasks with minimal difficulty/pain.        Start:  06/11/24    Expected End:  07/26/24       quick DASH: 26 raw = 40 (out of 10)         Pt. will demonstrate MI with stating and demonstrating home exercise program in order to improve patient's ability to perform self-care, household, and/or leisure tasks.        Start:  06/11/24    Expected End:  07/05/24            Pt. will improve LUE small finger ROM in order to perform self-care, household, work and/or leisure tasks. PIP flex at rest 25deg extensor lag          Start:  06/11/24    Expected End:  07/05/24            Pt. will increase LUE hand strength to increase participation in self-care, household, and leisure tasks. : 60lbs        Start:  06/11/24    Expected End:  07/26/24            Pt. will don/doff orthosis with MI and verbalize orthotic wear and care to ensure proper support of orthosis to protect injury when completing daily tasks.        Start:  06/11/24    Expected End:  07/26/24                DHAVAL Abdullahi/JAISON     Patient's first and last name, , procedure, and correct site confirmed prior to the start of procedure.

## 2024-07-08 ENCOUNTER — TREATMENT (OUTPATIENT)
Dept: OCCUPATIONAL THERAPY | Facility: HOSPITAL | Age: 49
End: 2024-07-08
Payer: COMMERCIAL

## 2024-07-08 DIAGNOSIS — M79.645 FINGER PAIN, LEFT: ICD-10-CM

## 2024-07-08 DIAGNOSIS — M24.542 CONTRACTURE OF FINGER JOINT, LEFT: ICD-10-CM

## 2024-07-08 PROCEDURE — 97110 THERAPEUTIC EXERCISES: CPT | Mod: GO | Performed by: OCCUPATIONAL THERAPIST

## 2024-07-08 ASSESSMENT — PAIN SCALES - GENERAL: PAINLEVEL_OUTOF10: 0 - NO PAIN

## 2024-07-08 ASSESSMENT — PAIN - FUNCTIONAL ASSESSMENT: PAIN_FUNCTIONAL_ASSESSMENT: 0-10

## 2024-07-08 NOTE — PROGRESS NOTES
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 Improved strength.  Patient tolerated treatment satisfactorily.  Good carryover of HEP. Added extension stretches and AROM this date.     Insurance  Payor: [facility] / Plan: [facility] / Product Type: *No Product type* /     Plan  Outpatient Plan  OT Plan: 3/6; review blocking exercises.  Frequency: 1x/wk  Duration: 6 weeks  Onset Date: 23  Certification Period Start Date: 24  Certification Period End Date: 24  Number of Treatments Authorized: 6 (red chart)  Rehab Potential: Good  Plan of Care Agreement: Patient  Planned Interventions: Therapeutic Exercise (10834), Therapeutic Activity (61373), Manual Therapy (95328), Ultrasound (59524), Orthosis Fabrication/Fitting (32273, 42925), Hot Packs, and Cold Packs     General  OT Last Visit  OT Received On: 24  Reason for Referral: PIP contracture  Referred By: Dr. Guillory/Maria Antonia Maldonado PA-C  Primary Language: English  Preferred Learning Style: kinesthetic, auditory, verbal, visual, written    Subjective  General Comment: \"Brace is helping.\"  Current condition since injury: Better   Performing HEP? " Yes    Precautions  Precautions  Medical Precautions: No known precautions/limitation    Pain  Pain Assessment  Pain Assessment: 0-10  0-10 (Numeric) Pain Score: 0 - No pain  As a result of session pt. Reported pain remained the same  End of session pain: 0/10  Aggravating Factors:  n/a  Relieving Factors:  n/a    Treatment  This therapist instructed and demonstrated interventions to patient, patient completed the following under direct supervision of this therapist:  Therapeutic Exercise  Therapeutic Exercise Performed: Yes ROM and strengthening exercises completed this date. Blocking exercises completed to finger this date. Pt. completed DIP flexion blocked, PIP flexion blocked, PIP flexion active isolated, PIP/DIP composite flexion, MP/PIP/DIP composite flexion x 10 reps with 10s holds. Edu patient on finger ROM exercises to add to HEP. Handout provided.     Modalities  Modalities Used: Yes Completed US x 8 minutes x.8cm/2 x 3.3 MHx over small to reduce pain and discomfort to promote ease with session. Good overall tolerance.     Education  Education  Individual(s) Educated: Patient  Education Provided: Diagnosis & Precautions, Symptom management, Orthotics wearing schedule and precautions, Orthotics and/or prosthetic trainging, Risk and benefits of OT discussed with patient or other, POC discussed and agreed upon  Home Program: Handout issued  Risk and Benefits Discussed with Patient/Caregiver/Other: yes  Patient/Caregiver Demonstrated Understanding: yes  Plan of Care Discussed and Agreed Upon: yes  Patient Response to Education: Patient/Caregiver Verbalized Understanding of Information, Patient/Caregiver Performed Return Demonstration of Exercises/Activities, Patient/Caregiver Asked Appropriate Questions    HEP Provided Today: Yes -  Access Code: [email or web address]  URL: [email or web address]  Date: 07/08/2024  Prepared by: Carri De Guzman    Exercises  - Seated Finger MP Extension AROM with Blocking   - 1 x daily - 7 x weekly - 3 sets - 10 reps  - Finger Extension or EDC Glides: Pen Roll From Fist to Hook Fist  - 1 x daily - 7 x weekly - 3 sets - 10 reps  - Seated Finger Adduction and Extension with Towel  - 1 x daily - 7 x weekly - 3 sets - 10 reps  - Finger Extension or EDC Isometric: Palm Up  - 1 x daily - 7 x weekly - 3 sets - 10 reps  - Seated Pen Hurdles  - 1 x daily - 7 x weekly - 3 sets - 10 reps  - Finger Spreading  - 1 x daily - 7 x weekly - 3 sets - 10 reps  - Wrist Prayer Stretch at Table  - 1 x daily - 7 x weekly - 3 sets - 10 reps    Goals  Active       OT Goals       Pt. will decrease QuickDASH score by 8 points to show improved ability to participate in daily tasks with minimal difficulty/pain.        Start:  06/11/24    Expected End:  07/26/24       quick DASH: 26 raw = 40 (out of 10)         Pt. will demonstrate MI with stating and demonstrating home exercise program in order to improve patient's ability to perform self-care, household, and/or leisure tasks.        Start:  06/11/24    Expected End:  07/05/24            Pt. will improve LUE small finger ROM in order to perform self-care, household, work and/or leisure tasks. PIP flex at rest 25deg extensor lag          Start:  06/11/24    Expected End:  07/05/24            Pt. will increase LUE hand strength to increase participation in self-care, household, and leisure tasks. : 60lbs        Start:  06/11/24    Expected End:  07/26/24            Pt. will don/doff orthosis with MI and verbalize orthotic wear and care to ensure proper support of orthosis to protect injury when completing daily tasks.        Start:  06/11/24    Expected End:  07/26/24                DHAVAL Abdullahi/JAISON

## 2024-07-16 ENCOUNTER — TREATMENT (OUTPATIENT)
Dept: OCCUPATIONAL THERAPY | Facility: HOSPITAL | Age: 49
End: 2024-07-16
Payer: COMMERCIAL

## 2024-07-16 DIAGNOSIS — M79.645 FINGER PAIN, LEFT: ICD-10-CM

## 2024-07-16 DIAGNOSIS — M24.542 CONTRACTURE OF FINGER JOINT, LEFT: ICD-10-CM

## 2024-07-16 PROCEDURE — 97110 THERAPEUTIC EXERCISES: CPT | Mod: GO | Performed by: OCCUPATIONAL THERAPIST

## 2024-07-16 ASSESSMENT — PAIN - FUNCTIONAL ASSESSMENT: PAIN_FUNCTIONAL_ASSESSMENT: 0-10

## 2024-07-16 ASSESSMENT — PAIN SCALES - GENERAL: PAINLEVEL_OUTOF10: 0 - NO PAIN

## 2024-07-16 NOTE — PROGRESS NOTES
Occupational Therapy  Occupational Therapy Treatment    Patient Name: Shana Millan  MRN: [id number]  : 1975  Today's Date: 2024  Time Calculation  Start Time: 0838 (pt. 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 Improved strength. Reduced frequency of pain. Improved grasp.   Patient tolerated treatment satisfactorily.  Good overall carryover of HEP thus far. Reports her finger ROM has improved with use of the brace and stretching. Patient continues to attempt to wear brace at night as tolerated. Patient to continue with HEP at this time.     Insurance  Payor: [facility] / Plan: [facility] / Product Type: *No Product type* /     Plan  Outpatient Plan  OT Plan: ; review blocking exercises.  Frequency: 1x/wk  Duration: 6 weeks  Onset Date: 23  Certification Period Start Date: 24  Certification Period End Date: 24  Number of Treatments Authorized: 6 (red chart)  Rehab Potential: Good  Plan of Care Agreement: Patient  Planned Interventions: Therapeutic Exercise (23381), Therapeutic Activity (14999), Manual Therapy (30571), Ultrasound (67434), Orthosis Fabrication/Fitting (63798, 10074), Hot Packs, and Cold Packs     General  OT Last Visit  OT Received On: 24  Reason for  "Referral: PIP contracture  Referred By: Dr. Guillory/Maria Antonia Maldonado PA-C  Primary Language: English  Preferred Learning Style: kinesthetic, auditory, verbal, visual, written    Subjective  General Comment: \"nothing new.\"  Current condition since injury: Better   Performing HEP? Yes    Precautions  Precautions  Medical Precautions: No known precautions/limitation    Pain  Pain Assessment  Pain Assessment: 0-10  0-10 (Numeric) Pain Score: 0 - No pain  As a result of session pt. Reported pain remained the same  End of session pain: 0/10  Aggravating Factors:  n/a  Relieving Factors:  n/a  Treatment  This therapist instructed and demonstrated interventions to patient, patient completed the following under direct supervision of this therapist:  Therapeutic Exercise  Therapeutic Exercise Performed: Yes ROM and strengthening exercises completed this date. Pt. engaged in tendon glides x 10 reps. Handout provided.  Reverse blocking completed x10 reps at PIP joint. Engaged in PIP passive extension stretch x10 reps with 5s holds. Patient engaged in finger ROM exercises today including prayer stretch x10 reps with 5s holds, finger spreading, pen hurdles, EDC isometrics, finger adduction and extension with towel, EDC glides, and MP extension with blocking x10 reps.     Education  Education  Individual(s) Educated: Patient  Education Provided: Diagnosis & Precautions, Symptom management, Orthotics wearing schedule and precautions, Orthotics and/or prosthetic trainging, Risk and benefits of OT discussed with patient or other, POC discussed and agreed upon  Risk and Benefits Discussed with Patient/Caregiver/Other: yes  Patient/Caregiver Demonstrated Understanding: yes  Plan of Care Discussed and Agreed Upon: yes  Patient Response to Education: Patient/Caregiver Verbalized Understanding of Information, Patient/Caregiver Performed Return Demonstration of Exercises/Activities, Patient/Caregiver Asked Appropriate Questions    HEP " Provided Today: No    Goals  Active       OT Goals       Pt. will decrease QuickDASH score by 8 points to show improved ability to participate in daily tasks with minimal difficulty/pain.        Start:  06/11/24    Expected End:  07/26/24       quick DASH: 26 raw = 40 (out of 10)         Pt. will demonstrate MI with stating and demonstrating home exercise program in order to improve patient's ability to perform self-care, household, and/or leisure tasks.        Start:  06/11/24    Expected End:  07/05/24            Pt. will improve LUE small finger ROM in order to perform self-care, household, work and/or leisure tasks. PIP flex at rest 25deg extensor lag          Start:  06/11/24    Expected End:  07/05/24            Pt. will increase LUE hand strength to increase participation in self-care, household, and leisure tasks. : 60lbs        Start:  06/11/24    Expected End:  07/26/24            Pt. will don/doff orthosis with MI and verbalize orthotic wear and care to ensure proper support of orthosis to protect injury when completing daily tasks.        Start:  06/11/24    Expected End:  07/26/24                DHAVAL Abdullahi/JAISON

## 2024-08-19 ENCOUNTER — DOCUMENTATION (OUTPATIENT)
Dept: OCCUPATIONAL THERAPY | Facility: HOSPITAL | Age: 49
End: 2024-08-19
Payer: COMMERCIAL

## 2024-08-19 NOTE — PROGRESS NOTES
Occupational Therapy    Discharge Summary    Name: Shana Millan  MRN: [id number]  : 1975  Date: 24    Discharge Summary: OT    Discharge Information: Date of discharge , Date of last visit 24, Date of evaluation 24, Number of attended visits 4, Referred by Dr. Guillory, and Referred for PIP contracture    Therapy Summary: Patient's goals set to address HEP, finger ROM, hand strength, and orthosis.     Discharge Status: Goals not met.      Rehab Discharge Reason: Failed to schedule and/or keep follow-up appointment(s)

## 2024-09-09 ENCOUNTER — APPOINTMENT (OUTPATIENT)
Dept: ORTHOPEDIC SURGERY | Facility: CLINIC | Age: 49
End: 2024-09-09
Payer: COMMERCIAL

## 2024-09-09 DIAGNOSIS — M24.542 CONTRACTURE OF FINGER JOINT, LEFT: Primary | ICD-10-CM

## 2024-09-09 PROCEDURE — 99213 OFFICE O/P EST LOW 20 MIN: CPT | Performed by: ORTHOPAEDIC SURGERY

## 2024-09-09 PROCEDURE — 1036F TOBACCO NON-USER: CPT | Performed by: ORTHOPAEDIC SURGERY

## 2024-09-09 ASSESSMENT — ENCOUNTER SYMPTOMS
EYE DISCHARGE: 0
ARTHRALGIAS: 1
SHORTNESS OF BREATH: 0
WOUND: 0
WHEEZING: 0
CHILLS: 0
SINUS PRESSURE: 0
FEVER: 0
JOINT SWELLING: 0
TROUBLE SWALLOWING: 0

## 2024-09-09 NOTE — PROGRESS NOTES
Reason for Appointment  No chief complaint on file.    History of Present Illness  Patient is a 49 y.o. female here today for follow-up evaluation of of her left small finger.  She has a slight PIP flexion contracture after a PIP joint dislocation about a year and a half ago.  She has been working with some dynamic splinting at night.  Previous steroid injection did not help the contracture, no pain or swelling in the joint she has good flexion.  No other changes in her past medical history, allergies, or medications.    Past Medical History:   Diagnosis Date    Epilepsy, unspecified, not intractable, without status epilepticus (Multi)     Epilepsy    Migraine, unspecified, not intractable, without status migrainosus     Migraines    Personal history of other specified conditions     History of insomnia       Past Surgical History:   Procedure Laterality Date    OTHER SURGICAL HISTORY  2020    Meniscus repair    OTHER SURGICAL HISTORY  2020    Ear surgery    OTHER SURGICAL HISTORY  2020    Hand surgery       Medication Documentation Review Audit       Reviewed by Maria Antonia Maldonado PA-C (Physician Assistant) on 24 at 0812      Medication Order Taking? Sig Documenting Provider Last Dose Status   carboxymethylcellulose (TheraTears) 0.25 % ophthalmic solution [id number] No Administer into affected eye(s). Historical Provider, MD Taking Active   diclofenac (Voltaren) 75 mg EC tablet [id number] No Take by mouth. Historical Provider, MD Taking Active   erythromycin (Romycin) 5 mg/gram (0.5 %) ophthalmic ointment [id number] No Use as directed Historical Provider, MD Taking Active   levETIRAcetam (Keppra) 750 mg tablet [id number]  Take 2 tablets (1,500 mg) by mouth 2 times a day. Berenice Tobar PA-C   24 6496   olopatadine (Patanol) 0.1 % ophthalmic solution [id number] No Administer into affected eye(s). Historical Provider, MD Taking Active   sodium hyaluronate (Euflexxa) 10 mg/mL(mw 2.4  -3.6 million) injection [id number] No Inject 2 mL weekly x 3 to the right knee Historical Provider, MD Taking Active   zonisamide (Zonegran) 100 mg capsule [id number]  Take 4 capsules (400 mg) by mouth once daily. At noon Berenice Tobar PA-C   24 9809                    No Known Allergies    Review of Systems   Constitutional:  Negative for chills and fever.   HENT:  Negative for nosebleeds, sinus pressure and trouble swallowing.    Eyes:  Negative for discharge.   Respiratory:  Negative for shortness of breath and wheezing.    Cardiovascular:  Negative for chest pain.   Musculoskeletal:  Positive for arthralgias. Negative for joint swelling.   Skin:  Negative for rash and wound.   All other systems reviewed and are negative.    Exam   On exam left small finger shows about a 20 degree flexion contracture at the PIP joint, she has good flexion of the digit with making a full fist.  No severe swelling over the joint.  Good pulses and sensation in the upper extremity    Assessment   Left small finger PIP flexion contracture    Plan   At this point she is not interested in any further intervention, have discussed that any surgical intervention such as capsular release may make her worse in terms of stiffness with flexion and she has good functional motion of the finger today.  She will continue to splint at night and if she sees any significant change in the contracture she can follow-up with us otherwise, she can follow-up with us as needed

## 2024-10-17 ENCOUNTER — TELEPHONE (OUTPATIENT)
Dept: NEUROLOGY | Facility: CLINIC | Age: 49
End: 2024-10-17
Payer: COMMERCIAL

## 2024-10-17 DIAGNOSIS — G40.909 SEIZURE DISORDER (MULTI): ICD-10-CM

## 2024-10-17 RX ORDER — LEVETIRACETAM 750 MG/1
1500 TABLET ORAL 2 TIMES DAILY
Qty: 360 TABLET | Refills: 0 | Status: SHIPPED | OUTPATIENT
Start: 2024-10-17

## 2024-10-17 RX ORDER — ZONISAMIDE 100 MG/1
400 CAPSULE ORAL DAILY
Qty: 360 CAPSULE | Refills: 0 | Status: SHIPPED | OUTPATIENT
Start: 2024-10-17

## 2024-10-17 NOTE — TELEPHONE ENCOUNTER
Pt needs refills on levetiracetam (Keppra) 750 mg tablet, Take 2 tablets (1,5600 mg) po BID and zonisamide (Zonegran) 100 mg capsule, Take 4 capsules (400 mg) po once daily at noon sent to [facility].  Please note pt made appt with Dr. Russell, had a virtual appt with Berenice Tobar 10/2023 and last saw Dr. Russell Jan. 2022

## 2024-11-13 DIAGNOSIS — G40.909 SEIZURE DISORDER (MULTI): ICD-10-CM

## 2024-11-13 RX ORDER — ZONISAMIDE 100 MG/1
CAPSULE ORAL
Qty: 360 CAPSULE | Refills: 0 | Status: SHIPPED | OUTPATIENT
Start: 2024-11-13 | End: 2024-11-14 | Stop reason: SDUPTHER

## 2024-11-14 ENCOUNTER — APPOINTMENT (OUTPATIENT)
Dept: NEUROLOGY | Facility: CLINIC | Age: 49
End: 2024-11-14
Payer: COMMERCIAL

## 2024-11-14 VITALS
SYSTOLIC BLOOD PRESSURE: 100 MMHG | BODY MASS INDEX: 35.93 KG/M2 | WEIGHT: 183 LBS | HEART RATE: 69 BPM | HEIGHT: 60 IN | DIASTOLIC BLOOD PRESSURE: 72 MMHG

## 2024-11-14 DIAGNOSIS — G40.909 SEIZURE DISORDER (MULTI): ICD-10-CM

## 2024-11-14 PROCEDURE — 99213 OFFICE O/P EST LOW 20 MIN: CPT | Performed by: PSYCHIATRY & NEUROLOGY

## 2024-11-14 PROCEDURE — 3008F BODY MASS INDEX DOCD: CPT | Performed by: PSYCHIATRY & NEUROLOGY

## 2024-11-14 PROCEDURE — 1036F TOBACCO NON-USER: CPT | Performed by: PSYCHIATRY & NEUROLOGY

## 2024-11-14 RX ORDER — LEVETIRACETAM 750 MG/1
1500 TABLET ORAL 2 TIMES DAILY
Qty: 360 TABLET | Refills: 3 | Status: SHIPPED | OUTPATIENT
Start: 2024-11-14 | End: 2025-11-14

## 2024-11-14 RX ORDER — ZONISAMIDE 100 MG/1
400 CAPSULE ORAL EVERY EVENING
Qty: 360 CAPSULE | Refills: 3 | Status: SHIPPED | OUTPATIENT
Start: 2024-11-14 | End: 2025-11-14

## 2024-11-14 NOTE — PATIENT INSTRUCTIONS
You are doing well, except occasional, predictable panic attacks.  As discussed,we are going to leave the two anticonvulsants as currently prescribed, in order to reduce the chance of having a breakthrough seizure.  Call our office if any questions.  Follow up in one year.

## 2024-11-14 NOTE — PROGRESS NOTES
Subjective   Shana Millan is a 49 y.o.   female.  HPI  This is a 49 year old woman with grand mal epilepsy, last seen by Berenice Tobar PA-C, in 10/11/2023, and by me in 2022.  She has not had any seizures since last seen by neuro, but she has had episodes walking up a hill, has a panic attack- feeling short of breath, feels uncomfortable, relieved by resisting, or by someone talking to her, to reduce her anxiety.  These episodes started with the second medication for seizures (Zonisamide).  She denies a prior history of anxiety attacks.  Objective   Neurological Exam  Physical Exam  I personally reviewed laboratory, radiographic, and medical studies which were pertinent for nothing.    Assessment/Plan

## 2025-01-27 DIAGNOSIS — G40.909 SEIZURE DISORDER (MULTI): ICD-10-CM

## 2025-01-27 RX ORDER — LEVETIRACETAM 750 MG/1
1500 TABLET ORAL 2 TIMES DAILY
Qty: 360 TABLET | Refills: 3 | Status: SHIPPED | OUTPATIENT
Start: 2025-01-27 | End: 2026-01-27

## 2025-04-29 ENCOUNTER — APPOINTMENT (OUTPATIENT)
Dept: OPHTHALMOLOGY | Facility: CLINIC | Age: 50
End: 2025-04-29
Payer: COMMERCIAL

## 2025-10-08 ENCOUNTER — APPOINTMENT (OUTPATIENT)
Dept: OPHTHALMOLOGY | Facility: CLINIC | Age: 50
End: 2025-10-08
Payer: MEDICAID

## 2025-10-09 ENCOUNTER — APPOINTMENT (OUTPATIENT)
Dept: OPHTHALMOLOGY | Facility: CLINIC | Age: 50
End: 2025-10-09
Payer: MEDICAID

## 2025-11-12 ENCOUNTER — APPOINTMENT (OUTPATIENT)
Dept: OPHTHALMOLOGY | Facility: CLINIC | Age: 50
End: 2025-11-12
Payer: MEDICAID

## 2025-11-13 ENCOUNTER — APPOINTMENT (OUTPATIENT)
Dept: NEUROLOGY | Facility: CLINIC | Age: 50
End: 2025-11-13
Payer: COMMERCIAL